# Patient Record
Sex: FEMALE | Race: WHITE
[De-identification: names, ages, dates, MRNs, and addresses within clinical notes are randomized per-mention and may not be internally consistent; named-entity substitution may affect disease eponyms.]

---

## 2020-10-23 ENCOUNTER — HOSPITAL ENCOUNTER (OUTPATIENT)
Dept: HOSPITAL 19 - COL.RAD | Age: 35
End: 2020-10-23
Payer: COMMERCIAL

## 2020-10-23 DIAGNOSIS — K50.90: Primary | ICD-10-CM

## 2020-10-23 DIAGNOSIS — K56.699: ICD-10-CM

## 2020-10-23 DIAGNOSIS — K52.9: ICD-10-CM

## 2020-10-29 ENCOUNTER — HOSPITAL ENCOUNTER (EMERGENCY)
Dept: HOSPITAL 19 - COL.ER | Age: 35
Discharge: HOME | End: 2020-10-29
Payer: COMMERCIAL

## 2020-10-29 VITALS — HEIGHT: 67.01 IN | BODY MASS INDEX: 34.29 KG/M2 | WEIGHT: 218.48 LBS

## 2020-10-29 VITALS — TEMPERATURE: 98.8 F

## 2020-10-29 VITALS — HEART RATE: 80 BPM | SYSTOLIC BLOOD PRESSURE: 125 MMHG | DIASTOLIC BLOOD PRESSURE: 71 MMHG

## 2020-10-29 DIAGNOSIS — K50.90: Primary | ICD-10-CM

## 2020-10-29 DIAGNOSIS — N76.0: ICD-10-CM

## 2020-10-29 DIAGNOSIS — Z88.1: ICD-10-CM

## 2020-10-29 LAB
ALBUMIN SERPL-MCNC: 4.2 GM/DL (ref 3.5–5)
ALP SERPL-CCNC: 76 U/L (ref 50–136)
ALT SERPL-CCNC: 24 U/L (ref 4–34)
ANION GAP SERPL CALC-SCNC: 8 MMOL/L (ref 7–16)
AST SERPL-CCNC: 25 U/L (ref 15–37)
BASOPHILS # BLD: 0.1 10*3/UL (ref 0–0.2)
BASOPHILS NFR BLD AUTO: 0.3 % (ref 0–2)
BILIRUB SERPL-MCNC: 0.4 MG/DL (ref 0–1)
BUN SERPL-MCNC: 15 MG/DL (ref 7–17)
CALCIUM SERPL-MCNC: 9.6 MG/DL (ref 8.4–10.2)
CHLORIDE SERPL-SCNC: 104 MMOL/L (ref 98–107)
CO2 SERPL-SCNC: 26 MMOL/L (ref 22–30)
CREAT SERPL-SCNC: 0.71 UMOL/L (ref 0.52–1.25)
CRP SERPL-MCNC: 3.9 MG/DL (ref 0–0.9)
EOSINOPHIL # BLD: 0.1 10*3/UL (ref 0–0.7)
EOSINOPHIL NFR BLD: 0.6 % (ref 0–4)
ERYTHROCYTE [DISTWIDTH] IN BLOOD BY AUTOMATED COUNT: 14.5 % (ref 11.5–14.5)
ERYTHROCYTE [SEDIMENTATION RATE] IN BLOOD: 13 MM/HR (ref 0–20)
GLUCOSE SERPL-MCNC: 101 MG/DL (ref 74–106)
GRANULOCYTES # BLD AUTO: 77.2 % (ref 42.2–75.2)
HCG SERPL QL: NEGATIVE
HCT VFR BLD AUTO: 37.9 % (ref 37–47)
HGB BLD-MCNC: 11.5 G/DL (ref 12.5–16)
LIPASE SERPL-CCNC: 35 U/L (ref 23–300)
LYMPHOCYTES # BLD: 3.2 10*3/UL (ref 1.2–3.4)
LYMPHOCYTES NFR BLD: 15.7 % (ref 20–51)
MCH RBC QN AUTO: 25 PG (ref 27–31)
MCHC RBC AUTO-ENTMCNC: 30 G/DL (ref 33–37)
MCV RBC AUTO: 81 FL (ref 80–100)
MONOCYTES # BLD: 1.2 10*3/UL (ref 0.1–0.6)
MONOCYTES NFR BLD AUTO: 5.7 % (ref 1.7–9.3)
NEUTROPHILS # BLD: 16 10*3/UL (ref 1.4–6.5)
PH UR STRIP.AUTO: 6 [PH] (ref 5–8)
PLATELET # BLD AUTO: 471 K/MM3 (ref 130–400)
PMV BLD AUTO: 11.1 FL (ref 7.4–10.4)
POTASSIUM SERPL-SCNC: 3.7 MMOL/L (ref 3.4–5)
PROT SERPL-MCNC: 8.1 GM/DL (ref 6.4–8.2)
RBC # BLD AUTO: 4.66 M/MM3 (ref 4.1–5.3)
RBC # UR STRIP.AUTO: (no result) /UL
RBC # UR: >50 /HPF
SODIUM SERPL-SCNC: 138 MMOL/L (ref 137–145)
SP GR UR STRIP.AUTO: 1.02 (ref 1–1.03)
SQUAMOUS # URNS: (no result) /HPF
UA DIPSTICK PNL UR STRIP.AUTO: (no result)
URN COLLECT METHOD CLASS: (no result)

## 2020-11-06 ENCOUNTER — HOSPITAL ENCOUNTER (OUTPATIENT)
Dept: HOSPITAL 19 - COL.LAB | Age: 35
End: 2020-11-06
Attending: INTERNAL MEDICINE
Payer: COMMERCIAL

## 2020-11-06 DIAGNOSIS — Z79.899: ICD-10-CM

## 2020-11-06 DIAGNOSIS — K50.00: Primary | ICD-10-CM

## 2020-11-06 LAB
ALBUMIN SERPL-MCNC: 4.3 GM/DL (ref 3.5–5)
ALP SERPL-CCNC: 70 U/L (ref 50–136)
ALT SERPL-CCNC: 46 U/L (ref 4–34)
ANION GAP SERPL CALC-SCNC: 7 MMOL/L (ref 7–16)
AST SERPL-CCNC: 28 U/L (ref 15–37)
BILIRUB SERPL-MCNC: 0.3 MG/DL (ref 0–1)
BUN SERPL-MCNC: 21 MG/DL (ref 7–17)
CALCIUM SERPL-MCNC: 9.7 MG/DL (ref 8.4–10.2)
CHLORIDE SERPL-SCNC: 100 MMOL/L (ref 98–107)
CO2 SERPL-SCNC: 29 MMOL/L (ref 22–30)
CREAT SERPL-SCNC: 0.76 UMOL/L (ref 0.52–1.25)
CRP SERPL-MCNC: 2.9 MG/DL (ref 0–0.9)
ERYTHROCYTE [DISTWIDTH] IN BLOOD BY AUTOMATED COUNT: 14.3 % (ref 11.5–14.5)
GLUCOSE SERPL-MCNC: 109 MG/DL (ref 74–106)
HBV SURFACE AB SER QL IA: <2
HCT VFR BLD AUTO: 35.5 % (ref 37–47)
HGB BLD-MCNC: 10.9 G/DL (ref 12.5–16)
IRON SERPL-MCNC: 34 UG/DL (ref 35–150)
MCH RBC QN AUTO: 24 PG (ref 27–31)
MCHC RBC AUTO-ENTMCNC: 31 G/DL (ref 33–37)
MCV RBC AUTO: 79 FL (ref 80–100)
PLATELET # BLD AUTO: 475 K/MM3 (ref 130–400)
PMV BLD AUTO: 10.3 FL (ref 7.4–10.4)
POTASSIUM SERPL-SCNC: 4 MMOL/L (ref 3.4–5)
PROT SERPL-MCNC: 8 GM/DL (ref 6.4–8.2)
RBC # BLD AUTO: 4.49 M/MM3 (ref 4.1–5.3)
SODIUM SERPL-SCNC: 136 MMOL/L (ref 137–145)
TIBC SERPL-MCNC: 351 UG/DL (ref 265–497)

## 2020-11-20 ENCOUNTER — HOSPITAL ENCOUNTER (INPATIENT)
Dept: HOSPITAL 19 - INPTSU | Age: 35
LOS: 28 days | Discharge: HOME | DRG: 331 | End: 2020-12-18
Attending: SURGERY | Admitting: SURGERY
Payer: COMMERCIAL

## 2020-11-20 VITALS — HEIGHT: 67.01 IN | WEIGHT: 205.25 LBS | BODY MASS INDEX: 32.21 KG/M2

## 2020-11-20 DIAGNOSIS — Z88.1: ICD-10-CM

## 2020-11-20 DIAGNOSIS — K50.10: ICD-10-CM

## 2020-11-20 DIAGNOSIS — K50.012: Primary | ICD-10-CM

## 2020-11-20 PROCEDURE — A4314 CATH W/DRAINAGE 2-WAY LATEX: HCPCS

## 2020-11-24 ENCOUNTER — HOSPITAL ENCOUNTER (OUTPATIENT)
Dept: HOSPITAL 19 - COL.RAD | Age: 35
End: 2020-11-24
Attending: INTERNAL MEDICINE
Payer: COMMERCIAL

## 2020-11-24 DIAGNOSIS — K62.89: ICD-10-CM

## 2020-11-24 DIAGNOSIS — K50.00: Primary | ICD-10-CM

## 2020-12-16 VITALS — SYSTOLIC BLOOD PRESSURE: 134 MMHG | HEART RATE: 62 BPM | DIASTOLIC BLOOD PRESSURE: 76 MMHG | TEMPERATURE: 97.9 F

## 2020-12-16 VITALS — DIASTOLIC BLOOD PRESSURE: 73 MMHG | TEMPERATURE: 97.4 F | HEART RATE: 71 BPM | SYSTOLIC BLOOD PRESSURE: 131 MMHG

## 2020-12-16 VITALS — SYSTOLIC BLOOD PRESSURE: 118 MMHG | DIASTOLIC BLOOD PRESSURE: 76 MMHG | HEART RATE: 76 BPM

## 2020-12-16 VITALS — SYSTOLIC BLOOD PRESSURE: 164 MMHG | DIASTOLIC BLOOD PRESSURE: 83 MMHG | HEART RATE: 66 BPM

## 2020-12-16 VITALS — SYSTOLIC BLOOD PRESSURE: 142 MMHG | DIASTOLIC BLOOD PRESSURE: 86 MMHG | TEMPERATURE: 97.3 F | HEART RATE: 115 BPM

## 2020-12-16 VITALS — SYSTOLIC BLOOD PRESSURE: 134 MMHG | DIASTOLIC BLOOD PRESSURE: 60 MMHG | HEART RATE: 62 BPM

## 2020-12-16 VITALS — DIASTOLIC BLOOD PRESSURE: 82 MMHG | HEART RATE: 87 BPM | SYSTOLIC BLOOD PRESSURE: 127 MMHG

## 2020-12-16 VITALS — TEMPERATURE: 97.9 F | HEART RATE: 52 BPM | DIASTOLIC BLOOD PRESSURE: 62 MMHG | SYSTOLIC BLOOD PRESSURE: 117 MMHG

## 2020-12-16 VITALS — DIASTOLIC BLOOD PRESSURE: 79 MMHG | SYSTOLIC BLOOD PRESSURE: 145 MMHG | HEART RATE: 85 BPM

## 2020-12-16 VITALS — HEART RATE: 80 BPM

## 2020-12-16 VITALS — DIASTOLIC BLOOD PRESSURE: 73 MMHG | SYSTOLIC BLOOD PRESSURE: 134 MMHG | HEART RATE: 70 BPM

## 2020-12-16 VITALS — HEART RATE: 75 BPM | SYSTOLIC BLOOD PRESSURE: 107 MMHG | DIASTOLIC BLOOD PRESSURE: 60 MMHG

## 2020-12-16 VITALS — DIASTOLIC BLOOD PRESSURE: 86 MMHG | SYSTOLIC BLOOD PRESSURE: 142 MMHG | HEART RATE: 115 BPM | TEMPERATURE: 97.3 F

## 2020-12-16 VITALS — DIASTOLIC BLOOD PRESSURE: 75 MMHG | TEMPERATURE: 97.5 F | HEART RATE: 84 BPM | SYSTOLIC BLOOD PRESSURE: 131 MMHG

## 2020-12-16 LAB
ALBUMIN SERPL-MCNC: 4.2 GM/DL (ref 3.5–5)
ALP SERPL-CCNC: 60 U/L (ref 50–136)
ALT SERPL-CCNC: 23 U/L (ref 4–34)
ANION GAP SERPL CALC-SCNC: 14 MMOL/L (ref 7–16)
AST SERPL-CCNC: 24 U/L (ref 15–37)
BASOPHILS # BLD: 0.1 10*3/UL (ref 0–0.2)
BASOPHILS NFR BLD AUTO: 0.6 % (ref 0–2)
BILIRUB SERPL-MCNC: 0.5 MG/DL (ref 0–1)
BUN SERPL-MCNC: 6 MG/DL (ref 7–17)
CALCIUM SERPL-MCNC: 9.7 MG/DL (ref 8.4–10.2)
CHLORIDE SERPL-SCNC: 103 MMOL/L (ref 98–107)
CO2 SERPL-SCNC: 19 MMOL/L (ref 22–30)
CREAT SERPL-SCNC: 0.71 UMOL/L (ref 0.52–1.25)
EOSINOPHIL # BLD: 0.2 10*3/UL (ref 0–0.7)
EOSINOPHIL NFR BLD: 2 % (ref 0–4)
ERYTHROCYTE [DISTWIDTH] IN BLOOD BY AUTOMATED COUNT: 15.3 % (ref 11.5–14.5)
GLUCOSE SERPL-MCNC: 185 MG/DL (ref 74–106)
GRANULOCYTES # BLD AUTO: 71.8 % (ref 42.2–75.2)
HCG SERPL QL: NEGATIVE
HCT VFR BLD AUTO: 36.1 % (ref 37–47)
HGB BLD-MCNC: 11.1 G/DL (ref 12.5–16)
LYMPHOCYTES # BLD: 2.1 10*3/UL (ref 1.2–3.4)
LYMPHOCYTES NFR BLD: 18.4 % (ref 20–51)
MCH RBC QN AUTO: 24 PG (ref 27–31)
MCHC RBC AUTO-ENTMCNC: 31 G/DL (ref 33–37)
MCV RBC AUTO: 77 FL (ref 80–100)
MONOCYTES # BLD: 0.8 10*3/UL (ref 0.1–0.6)
MONOCYTES NFR BLD AUTO: 6.9 % (ref 1.7–9.3)
NEUTROPHILS # BLD: 8.1 10*3/UL (ref 1.4–6.5)
PLATELET # BLD AUTO: 474 K/MM3 (ref 130–400)
PMV BLD AUTO: 9.9 FL (ref 7.4–10.4)
POTASSIUM SERPL-SCNC: 3.3 MMOL/L (ref 3.4–5)
PROT SERPL-MCNC: 8.2 GM/DL (ref 6.4–8.2)
RBC # BLD AUTO: 4.69 M/MM3 (ref 4.1–5.3)
SODIUM SERPL-SCNC: 136 MMOL/L (ref 137–145)

## 2020-12-16 PROCEDURE — 8E0W4CZ ROBOTIC ASSISTED PROCEDURE OF TRUNK REGION, PERCUTANEOUS ENDOSCOPIC APPROACH: ICD-10-PCS | Performed by: SURGERY

## 2020-12-16 PROCEDURE — 0DTH4ZZ RESECTION OF CECUM, PERCUTANEOUS ENDOSCOPIC APPROACH: ICD-10-PCS | Performed by: SURGERY

## 2020-12-16 NOTE — NUR
Patient to room via bed from PACU. Patient says that she just doesn't feel
great, feels like she just had surgery. Would like to eat some food. Explain
that we will start with clear liquids initially and advance after that.
Oriented to room. Denies needs.

## 2020-12-16 NOTE — NUR
Patient sitting up in bed. Having back pain, chronic issue for patient, she
would like something for pain. Will administer Tramadol as prescribed. Provide
jello and water. Patient asks for back rub, will perform.

## 2020-12-16 NOTE — NUR
The patient ambulated back to [place] [facility] independently using a steady gait and
appeared to tolerate the activity well. Vital signs obtained. Consent signed.
18G IV started in left wrist with one stick, LR infusing without difficulty.
Heart Reg. Lungs clear. Bowel sounsd audible. Call light is within reach. The
patient denies any further needs at this time. Labs were obtained from IV
start as orered. Will continue to monitor the patient.

## 2020-12-16 NOTE — NUR
Patient resting in bed with eyes closed. Opens eyes when name called out.
Discussed scheduled meds that will be given. Patient says her pain has
decreased to 5/10. Denies additional needs at this time.

## 2020-12-16 NOTE — NUR
Patient up to bathroom. Able to void. Returns to bed. Patient would like to do
a soft diet at this time. Provided pudding. Patient denies needs at this time.

## 2020-12-16 NOTE — NUR
Patient up to bathroom. Gait steady. Rating pain 6/10 in abd and the pain
increases with movement. Would like pain medication. Administer Roxicodone as
prescribed. Explain to the patient that she can ambulate in halls with her
nonskid socks and mask on. Patient says that she called the kitchen and
ordered mashed potatoes to eat. Patient denies additional needs at this time.

## 2020-12-17 VITALS — HEART RATE: 67 BPM | DIASTOLIC BLOOD PRESSURE: 61 MMHG | SYSTOLIC BLOOD PRESSURE: 127 MMHG | TEMPERATURE: 98.1 F

## 2020-12-17 VITALS — SYSTOLIC BLOOD PRESSURE: 116 MMHG | HEART RATE: 55 BPM | DIASTOLIC BLOOD PRESSURE: 62 MMHG | TEMPERATURE: 97.7 F

## 2020-12-17 VITALS — DIASTOLIC BLOOD PRESSURE: 50 MMHG | SYSTOLIC BLOOD PRESSURE: 108 MMHG | TEMPERATURE: 97.6 F | HEART RATE: 90 BPM

## 2020-12-17 VITALS — TEMPERATURE: 97.6 F | DIASTOLIC BLOOD PRESSURE: 57 MMHG | SYSTOLIC BLOOD PRESSURE: 122 MMHG | HEART RATE: 70 BPM

## 2020-12-17 VITALS — TEMPERATURE: 98.1 F | DIASTOLIC BLOOD PRESSURE: 57 MMHG | HEART RATE: 69 BPM | SYSTOLIC BLOOD PRESSURE: 105 MMHG

## 2020-12-17 LAB
ANION GAP SERPL CALC-SCNC: 6 MMOL/L (ref 7–16)
BASOPHILS # BLD: 0 10*3/UL (ref 0–0.2)
BASOPHILS NFR BLD AUTO: 0.1 % (ref 0–2)
BUN SERPL-MCNC: 5 MG/DL (ref 7–17)
CALCIUM SERPL-MCNC: 9 MG/DL (ref 8.4–10.2)
CHLORIDE SERPL-SCNC: 105 MMOL/L (ref 98–107)
CO2 SERPL-SCNC: 26 MMOL/L (ref 22–30)
CREAT SERPL-SCNC: 0.57 UMOL/L (ref 0.52–1.25)
EOSINOPHIL # BLD: 0 10*3/UL (ref 0–0.7)
EOSINOPHIL NFR BLD: 0 % (ref 0–4)
ERYTHROCYTE [DISTWIDTH] IN BLOOD BY AUTOMATED COUNT: 15.3 % (ref 11.5–14.5)
GLUCOSE SERPL-MCNC: 103 MG/DL (ref 74–106)
GRANULOCYTES # BLD AUTO: 84.3 % (ref 42.2–75.2)
HCT VFR BLD AUTO: 30.2 % (ref 37–47)
HGB BLD-MCNC: 9.2 G/DL (ref 12.5–16)
LYMPHOCYTES # BLD: 1.6 10*3/UL (ref 1.2–3.4)
LYMPHOCYTES NFR BLD: 9.8 % (ref 20–51)
MCH RBC QN AUTO: 23 PG (ref 27–31)
MCHC RBC AUTO-ENTMCNC: 31 G/DL (ref 33–37)
MCV RBC AUTO: 77 FL (ref 80–100)
MONOCYTES # BLD: 0.9 10*3/UL (ref 0.1–0.6)
MONOCYTES NFR BLD AUTO: 5.4 % (ref 1.7–9.3)
NEUTROPHILS # BLD: 13.6 10*3/UL (ref 1.4–6.5)
PLATELET # BLD AUTO: 417 K/MM3 (ref 130–400)
PMV BLD AUTO: 10.2 FL (ref 7.4–10.4)
POTASSIUM SERPL-SCNC: 4.1 MMOL/L (ref 3.4–5)
RBC # BLD AUTO: 3.93 M/MM3 (ref 4.1–5.3)
SODIUM SERPL-SCNC: 137 MMOL/L (ref 137–145)

## 2020-12-17 NOTE — NUR
Patient alert and oriented, answers questions appropriately.  See assessment.
Abdomen soft, non tender, non distended.  Bowel sounds active x4 quads.
+Flatus. +Bowel movement.  Lap sites and low transverse incision with dressing
CDI.  C/o right shoulder discomfort.  ERAS protocol reviewed with patient.  No
c/o at this time.

## 2020-12-17 NOTE — NUR
met with patient to discuss discharge planning. Patient lives in
[place] with her , Jack (ph#[phone]) and their two children,
ages three and seventeen. Patient sees Dr. Lott for primary care and
obtains medications from [facility] with no difficulties. Patient is a
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
school. Patient does not use any DME and is independent with ADLS. Patient
does not have Advance Directives and is not interested in establishing DPOA-HC
at this time. Patient plans to return home upon discharge. No needs identified
at this time.

## 2020-12-17 NOTE — NUR
Assisted to BSC with gait belt and one assist. Transfers poorly. Has large
loose stool and void. Back to bed. Pt is alert, confused at times. Has IVF
infusing to right IJ without problem.

## 2020-12-17 NOTE — NUR
PT IN BED, WATCHING TV. IS ALERT AND ORIENTED X4. HAS SL TO LEFT FOREARM
WITHOUT REDNESS OR SWELLING. PASSING FLATUS. HAS LAP SITES X4 TO LEFT ABD AND
LOW TRANSVERSE INCISION, WITH OLD DRAINAGE NOTED. DENIES NEED FOR PAIN MEDS AT
THIS TIME.

## 2020-12-18 VITALS — HEART RATE: 90 BPM | TEMPERATURE: 97.9 F | DIASTOLIC BLOOD PRESSURE: 61 MMHG | SYSTOLIC BLOOD PRESSURE: 125 MMHG

## 2020-12-18 VITALS — TEMPERATURE: 98.6 F | HEART RATE: 59 BPM | DIASTOLIC BLOOD PRESSURE: 58 MMHG | SYSTOLIC BLOOD PRESSURE: 106 MMHG

## 2020-12-18 NOTE — NUR
PATIENT DISCHARGING HOME.  IS ON HIS WAY. GAVE DISCHARGE INSTRUCTIONS,
E-SCRIPT SENT, DISCUSSED F/U APT AND ANSWERED ALL QUESTIONS/CONCERNS. DC'D
LEFT FORARM IV, COVERED WITH GAUZE & COBAN. PATIENT DRESSED AND PACKED FOR
DISCHARGE.

## 2020-12-18 NOTE — NUR
Initial visit; Patient thanked  for looking in on her offering God's
blessings for a happy, healthier New Year.

## 2020-12-18 NOTE — NUR
PATIENT IS A&O. VSS. PATIENT REPORTS MILD DISCOMFORT IN ABD POST OP AND DENIES
NEED FOR PAIN MEDS. ABD LAP SITES X4 AND TRANSVERSE DRESSINGS ARE CD&I. ABD IS
ROUND, SOFT AND WITH POSITIVE BOWL SOUNDS. PATIENT IS PASSING GAS AND REPORTS
SEVERAL LOOSE STOOLS THIS AM. NO C/O N/V. LEFT FORARM IV TO INT. HEAD TO TOE
ASSESSMENT WNL. PATIENT INDEPENDENT IN ROOM AND HOPING TO DISCHARGE HOME LATER
TODAY.

## 2020-12-21 ENCOUNTER — HOSPITAL ENCOUNTER (OUTPATIENT)
Dept: HOSPITAL 19 - COL.RAD | Age: 35
End: 2020-12-21
Attending: INTERNAL MEDICINE
Payer: COMMERCIAL

## 2020-12-21 DIAGNOSIS — Z98.890: ICD-10-CM

## 2020-12-21 DIAGNOSIS — K76.9: Primary | ICD-10-CM

## 2020-12-21 DIAGNOSIS — Z87.19: ICD-10-CM

## 2021-01-17 ENCOUNTER — HOSPITAL ENCOUNTER (EMERGENCY)
Dept: HOSPITAL 19 - COL.ER | Age: 36
Discharge: HOME | End: 2021-01-17
Attending: EMERGENCY MEDICINE
Payer: COMMERCIAL

## 2021-01-17 VITALS — TEMPERATURE: 98.6 F

## 2021-01-17 VITALS — BODY MASS INDEX: 30.83 KG/M2 | HEIGHT: 67.01 IN | WEIGHT: 196.43 LBS

## 2021-01-17 VITALS — HEART RATE: 80 BPM | DIASTOLIC BLOOD PRESSURE: 52 MMHG | SYSTOLIC BLOOD PRESSURE: 97 MMHG

## 2021-01-17 DIAGNOSIS — Z87.891: ICD-10-CM

## 2021-01-17 DIAGNOSIS — Z88.1: ICD-10-CM

## 2021-01-17 DIAGNOSIS — K50.90: ICD-10-CM

## 2021-01-17 DIAGNOSIS — M79.671: Primary | ICD-10-CM

## 2021-01-17 DIAGNOSIS — M79.672: ICD-10-CM

## 2021-05-19 ENCOUNTER — HOSPITAL ENCOUNTER (OUTPATIENT)
Dept: HOSPITAL 19 - SDCO | Age: 36
Discharge: HOME | End: 2021-05-19
Attending: INTERNAL MEDICINE
Payer: COMMERCIAL

## 2021-05-19 VITALS — HEART RATE: 74 BPM | SYSTOLIC BLOOD PRESSURE: 109 MMHG | DIASTOLIC BLOOD PRESSURE: 73 MMHG

## 2021-05-19 VITALS — DIASTOLIC BLOOD PRESSURE: 56 MMHG | HEART RATE: 77 BPM | SYSTOLIC BLOOD PRESSURE: 104 MMHG

## 2021-05-19 VITALS — BODY MASS INDEX: 32.21 KG/M2 | HEIGHT: 67 IN | WEIGHT: 205.25 LBS

## 2021-05-19 VITALS — TEMPERATURE: 98.1 F | HEART RATE: 85 BPM | SYSTOLIC BLOOD PRESSURE: 123 MMHG | DIASTOLIC BLOOD PRESSURE: 78 MMHG

## 2021-05-19 VITALS — DIASTOLIC BLOOD PRESSURE: 47 MMHG | SYSTOLIC BLOOD PRESSURE: 121 MMHG | TEMPERATURE: 97.3 F | HEART RATE: 93 BPM

## 2021-05-19 DIAGNOSIS — Z98.0: ICD-10-CM

## 2021-05-19 DIAGNOSIS — K50.10: ICD-10-CM

## 2021-05-19 DIAGNOSIS — K64.4: ICD-10-CM

## 2021-05-19 DIAGNOSIS — D64.9: ICD-10-CM

## 2021-05-19 DIAGNOSIS — G89.29: ICD-10-CM

## 2021-05-19 DIAGNOSIS — K63.3: ICD-10-CM

## 2021-05-19 DIAGNOSIS — M54.9: ICD-10-CM

## 2021-05-19 DIAGNOSIS — Z87.891: ICD-10-CM

## 2021-05-19 DIAGNOSIS — N82.3: ICD-10-CM

## 2021-05-19 DIAGNOSIS — Z79.899: ICD-10-CM

## 2021-05-19 DIAGNOSIS — K50.00: Primary | ICD-10-CM

## 2021-05-19 NOTE — NUR
Pt to GI bay 3 via cart from ENDO. Pt awake and alert. Denies pain or nausea.
Pt ambulates to recliner with stand by assitance. Warm blanket provided. VSS.
Muffin, water and coffee given per request. Will continue to monitor. Call
light within reach.

## 2021-05-19 NOTE — NUR
Discharge instructions reviewed. Pt voices understanding. IV site discontinued
with all parts intact. Pt up to dress. Call light wthin reach.

## 2021-06-21 ENCOUNTER — HOSPITAL ENCOUNTER (OUTPATIENT)
Dept: HOSPITAL 19 - COL.RAD | Age: 36
End: 2021-06-21
Attending: INTERNAL MEDICINE
Payer: COMMERCIAL

## 2021-06-21 DIAGNOSIS — K52.9: Primary | ICD-10-CM
